# Patient Record
(demographics unavailable — no encounter records)

---

## 2024-11-06 NOTE — PROCEDURE
[FreeTextEntry1] : He was placed into a well-padded and well-molded left short arm fiberglass cast. He and his mother were instructed on cast care, elevation and range of motion exercises to the digits. He will follow-up in 4 weeks for x-rays out of plaster.
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
## 2024-11-06 NOTE — PHYSICAL EXAM
[de-identified] : - Constitutional: This is a healthy appearing young male. He is accompanied by his mother.  - Psych: Patient is alert and oriented to person, place and time.  Patient has a normal mood and affect. - Cardiovascular: Normal pulses throughout the upper extremities.   - Musculoskeletal: Gait is normal.  - Neuro: Strength and sensation are intact throughout the upper extremities.  Patient has normal coordination. - Respiratory:  Patient exhibits no evidence of shortness of breath or difficulty breathing. - Skin: No rashes, lesions, or other abnormalities are noted in the upper extremities.  ---  Examination of his left wrist and hand after the splint was removed demonstrates tenderness and mild swelling along the ulnar styloid.  There is no swelling or tenderness along the distal radius dorsally carpal bones or snuffbox.  He has full flexion and extension of the digits.  He is neurovascularly intact distally. [de-identified] : I reviewed x-rays of the left elbow, wrist and forearm dated 11/4/2024 which demonstrated a minimally displaced ulna styloid fracture, his physes are open. There is no obvious associated distal radius fracture.
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
## 2024-12-04 NOTE — PHYSICAL EXAM
[de-identified] : - Constitutional: This is a healthy appearing young male. He is accompanied by his mother.  - Psych: Patient is alert and oriented to person, place and time.  Patient has a normal mood and affect. - Cardiovascular: Normal pulses throughout the upper extremities.   - Musculoskeletal: Gait is normal.   ---  Examination of his left wrist and hand demonstrates decreased swelling.  There is no residual tenderness along the ulnar styloid.  There is no swelling or tenderness along the distal radius..  He has full flexion and extension of the digits.  He is neurovascularly intact distally. [de-identified] : PA, lateral oblique radiographs of his left wrist demonstrate his ulnar styloid fracture to be healing, but the fracture line is still visualized

## 2024-12-04 NOTE — PHYSICAL EXAM
[de-identified] : - Constitutional: This is a healthy appearing young male. He is accompanied by his mother.  - Psych: Patient is alert and oriented to person, place and time.  Patient has a normal mood and affect. - Cardiovascular: Normal pulses throughout the upper extremities.   - Musculoskeletal: Gait is normal.   ---  Examination of his left wrist and hand after the cast was removed demonstrates decreased swelling.  There is no residual tenderness along the ulnar styloid.  There is no swelling or tenderness along the distal radius..  He has full flexion and extension of the digits.  He is neurovascularly intact distally. [de-identified] : PA, lateral oblique radiographs of his left wrist demonstrate his ulnar styloid fracture to be healing, but the fracture line is still visualized
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
## 2024-12-04 NOTE — DISCUSSION/SUMMARY
[FreeTextEntry1] : I had a discussion regarding today's visit, the diagnosis and treatment recommendations and options.  We also discussed changes since the last visit.  At this time, he was provided with a left carpal tunnel splint. He was instructed to avoid playing in any sports and he and his mother were instructed on activity modification.. He will follow up in 2 weeks.   The patient and their mother have agreed to this plan of management and have expressed full understanding.  All questions were fully answered to their satisfaction.  My cumulative time spent on today's visit included: Preparation for the visit, review of the medical records, review of pertinent diagnostic studies, examination and counseling of the patient and the mother on the above diagnosis, treatment plan and prognosis, orders of diagnostic tests, medications and/or appropriate procedures and documentation in the medical records of today's visit.

## 2024-12-04 NOTE — DISCUSSION/SUMMARY
[FreeTextEntry1] : I had a discussion regarding today's visit, the diagnosis and treatment recommendations and options.  We also discussed changes since the last visit.  At this time, he and his mother were instructed on  The patient and their mother have agreed to this plan of management and have expressed full understanding.  All questions were fully answered to their satisfaction.  My cumulative time spent on today's visit included: Preparation for the visit, review of the medical records, review of pertinent diagnostic studies, examination and counseling of the patient and the mother on the above diagnosis, treatment plan and prognosis, orders of diagnostic tests, medications and/or appropriate procedures and documentation in the medical records of today's visit.

## 2024-12-04 NOTE — PHYSICAL EXAM
[de-identified] : - Constitutional: This is a healthy appearing young male. He is accompanied by his mother.  - Psych: Patient is alert and oriented to person, place and time.  Patient has a normal mood and affect. - Cardiovascular: Normal pulses throughout the upper extremities.   - Musculoskeletal: Gait is normal.   ---  Examination of his left wrist and hand after the cast was removed demonstrates decreased swelling.  There is no residual tenderness along the ulnar styloid.  There is no swelling or tenderness along the distal radius..  He has full flexion and extension of the digits.  He is neurovascularly intact distally. [de-identified] : PA, lateral oblique radiographs of his left wrist demonstrate his ulnar styloid fracture to be healing, but the fracture line is still visualized

## 2024-12-18 NOTE — PHYSICAL EXAM
[de-identified] : - Constitutional: This is a healthy appearing young male. He is accompanied by his mother.  - Psych: Patient is alert and oriented to person, place and time.  Patient has a normal mood and affect. - Cardiovascular: Normal pulses throughout the upper extremities.   - Musculoskeletal: Gait is normal.   ---  Examination of his left wrist and hand demonstrates no residual swelling.  There is no residual tenderness along the ulnar styloid.  There is no pain with pronation or supination.  There is no swelling or tenderness along the distal radius.  He has full flexion and extension of the digits.  He is neurovascularly intact distally. [de-identified] : PA, lateral oblique radiographs of his left wrist demonstrate his ulnar styloid fracture to be healing, but a portion of the fracture line is still visualized

## 2024-12-18 NOTE — PHYSICAL EXAM
[de-identified] : - Constitutional: This is a healthy appearing young male. He is accompanied by his mother.  - Psych: Patient is alert and oriented to person, place and time.  Patient has a normal mood and affect. - Cardiovascular: Normal pulses throughout the upper extremities.   - Musculoskeletal: Gait is normal.   ---  Examination of his left wrist and hand demonstrates no residual swelling.  There is no residual tenderness along the ulnar styloid.  There is no pain with pronation or supination.  There is no swelling or tenderness along the distal radius.  He has full flexion and extension of the digits.  He is neurovascularly intact distally. [de-identified] : PA, lateral oblique radiographs of his left wrist demonstrate his ulnar styloid fracture to be healing, but a portion of the fracture line is still visualized

## 2024-12-18 NOTE — RETURN TO WORK/SCHOOL
[FreeTextEntry1] : Patient was seen and examined for left wrist. He may participate in gym, sports activities and soccer.

## 2024-12-18 NOTE — DISCUSSION/SUMMARY
[FreeTextEntry1] : I had a discussion regarding today's visit, the diagnosis and treatment recommendations and options.  We also discussed changes since the last visit.  At this time, as he is no longer tender and no longer has symptoms, I told him and his mother that he can return back to sports and gym.  I did tell him that he no longer requires the splint.  They do understand that some of these fractures heal with a nonunion, but these are usually not symptomatic.  Therefore, I see no indication for further x-rays at this point in time.  He will follow up as needed.   The patient and their mother have agreed to this plan of management and have expressed full understanding.  All questions were fully answered to their satisfaction.  My cumulative time spent on today's visit included: Preparation for the visit, review of the medical records, review of pertinent diagnostic studies, examination and counseling of the patient and the mother on the above diagnosis, treatment plan and prognosis, orders of diagnostic tests, medications and/or appropriate procedures and documentation in the medical records of today's visit.

## 2024-12-18 NOTE — HISTORY OF PRESENT ILLNESS
[FreeTextEntry1] : 47 days status post fall resulting in minimally displaced left ulnar styloid fracture  See prior notes.  His cast was removed 2 weeks ago and he was fitted with a splint.  He is overall doing well today. He rates his pain as a 2/10 and denies any numbness, swelling, clicking or radiating pain.   He is accompanied by his mother.